# Patient Record
Sex: FEMALE | Race: WHITE | NOT HISPANIC OR LATINO
[De-identification: names, ages, dates, MRNs, and addresses within clinical notes are randomized per-mention and may not be internally consistent; named-entity substitution may affect disease eponyms.]

---
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
## 2018-01-17 ENCOUNTER — OFFICE VISIT (OUTPATIENT)
Dept: ORTHOPEDIC SURGERY | Age: 18
End: 2018-01-17

## 2018-01-17 VITALS — BODY MASS INDEX: 20.89 KG/M2 | WEIGHT: 130 LBS | HEIGHT: 66 IN

## 2018-01-17 DIAGNOSIS — M25.561 ACUTE PAIN OF RIGHT KNEE: Primary | ICD-10-CM

## 2018-01-17 PROCEDURE — 99214 OFFICE O/P EST MOD 30 MIN: CPT | Performed by: ORTHOPAEDIC SURGERY

## 2018-01-17 NOTE — PROGRESS NOTES
History of Present Illness:  Partha Sky is a 21 y.o. female recheck evaluation right knee here today to see how she is doing with her posterior cruciate ligament tear    Location right Knee   Severity much improved  Duration 4 weeks  Associated sign/symptoms pain and swelling    Medical History  Patient's medications, allergies, past medical, surgical, social and family histories were reviewed and updated as appropriate. Review of Systems  Pertinent items are noted in HPI  Review of systems reviewed from Patient History Form dated on 12/7/17 and available in the patient's chart under the Media tab. No change in the patients medical history form. Examination:  General Exam:    Vitals: Height 5' 6\" (1.676 m), weight 130 lb (59 kg). Constitutional: Patient is adequately groomed with no evidence of malnutrition  Mental Status: The patient is alert and  oriented to time, place and person. The patient's mood and affect are appropriate. Lymphatic: The lymphatic examination bilaterally reveals all areas to be without enlargement or induration. Vascular: Examination reveals no swelling or calf tenderness. Peripheral pulses are palpable and 2+. Neurological: The patient has good coordination. There is no weakness or sensory deficit. Skin:    Head/Neck: inspection reveals no rashes, ulcerations or lesions. Trunk:  inspection reveals no rashes, ulcerations or lesions. Right Lower Extremity: inspection reveals no rashes, ulcerations or lesions. Left Lower Extremity: inspection reveals no rashes, ulcerations or lesions.                                           PHYSICAL EXAM:        Knee Examination  Inspection:  No abrasions no lacerations no signs of infection or obvious deformity no obvious  swelling and joint effusion     Palpation:   Palpation reveals no  Pain medial joint line,   No lateral joint line pain,  no joint effusion    Range of Motion:  0 - 140° flexion/ extension   Hip extension to 20

## 2018-01-25 ENCOUNTER — HOSPITAL ENCOUNTER (OUTPATIENT)
Dept: PHYSICAL THERAPY | Age: 18
Discharge: HOME | End: 2018-01-31
Admitting: ORTHOPAEDIC SURGERY

## 2018-01-25 NOTE — FLOWSHEET NOTE
SL RDL 1 15    SL bridge on bosu 1 15 R         Manual Intervention      Knee mobs/PROM      Tib/Fem Mobs      Patella Mobs      Ankle mobs                  NMR re-education: 10 min      [place]an/Biofeedback 10/10      G. Med activation/sidelying      G. Max Activation/prone      Hip Ext full ROM G. Activation      Bosu Bal and Prop- G Med      Single leg stance/Balance/Prop 20 8 Black TB   Bosu Retro G. Med act      SL stand with star tap reach 1 10              Therapeutic Exercise and NMR EXR  [x] (53452) Provided verbal/tactile cueing for activities related to strengthening, flexibility, endurance, ROM for improvements in LE, proximal hip, and core control with self care, mobility, lifting, ambulation. [x] (06152) Provided verbal/tactile cueing for activities related to improving balance, coordination, kinesthetic sense, posture, motor skill, proprioception  to assist with LE, proximal hip, and core control in self care, mobility, lifting, ambulation and eccentric single leg control.      NMR and Therapeutic Activities:    [x] (54241 or 85785) Provided verbal/tactile cueing for activities related to improving balance, coordination, kinesthetic sense, posture, motor skill, proprioception and motor activation to allow for proper function of core, proximal hip and LE with self care and ADLs  [] (40360) Gait Re-education- Provided training and instruction to the patient for proper LE, core and proximal hip recruitment and positioning and eccentric body weight control with ambulation re-education including up and down stairs     Home Exercise Program:    [x] (72610) Reviewed/Progressed HEP activities related to strengthening, flexibility, endurance, ROM of core, proximal hip and LE for functional self-care, mobility, lifting and ambulation/stair navigation   [] (20045)Reviewed/Progressed HEP activities related to improving balance, coordination, kinesthetic sense, posture, motor skill, proprioception of core, proximal hip and LE for self care, mobility, lifting, and ambulation/stair navigation      Manual Treatments:  PROM / STM / Oscillations-Mobs:  G-I, II, III, IV (PA's, Inf., Post.)  [] (74463) Provided manual therapy to mobilize LE, proximal hip and/or LS spine soft tissue/joints for the purpose of modulating pain, promoting relaxation,  increasing ROM, reducing/eliminating soft tissue swelling/inflammation/restriction, improving soft tissue extensibility and allowing for proper ROM for normal function with self care, mobility, lifting and ambulation. Modalities:  Declined ice      Charges:  Timed Code Treatment Minutes: 30   Total Treatment Minutes: 50     [x] EVAL (LOW) 72763 (typically 20 minutes face-to-face)  [] EVAL (MOD) 60516 (typically 30 minutes face-to-face)  [] EVAL (HIGH) 58621 (typically 45 minutes face-to-face)  [] RE-EVAL     [x] XL(94102) x  1   [] IONTO  [x] NMR (83589) x  1   [] VASO  [] Manual (29520) x       [] Other:  [] TA x       [] Mech Traction (14155)  [] ES(attended) (69635)      [] ES (un) (37744):     GOALS:  Patient stated goal: return to hockey    Therapist goals for Patient:   Short Term Goals: To be achieved in: 2 weeks  1. Independent in HEP and progression per patient tolerance, in order to prevent re-injury. 2. Patient will have a decrease in pain to facilitate improvement in movement, function, and ADLs as indicated by Functional Deficits. Long Term Goals: To be achieved in: 4 weeks  1. Disability index score of 10% or less for the LEFS to assist with reaching prior level of function. 2. Patient will demonstrate increased AROM to WNL to allow for proper joint functioning as indicated by patients Functional Deficits. 3. Patient will demonstrate an increase in Strength to good proximal hip and knee strength and control, within 5lb HHD in LE to allow for proper functional mobility as indicated by patients Functional Deficits.    4. Patient will return to stair and squatting functional activities without increased symptoms or restriction. 5. Patient will return to playing club hockey without increased symptoms or restriction. (patient specific functional goal)    Progression Towards Functional goals:  [] Patient is progressing as expected towards functional goals listed. [] Progression is slowed due to complexities listed. [] Progression has been slowed due to co-morbidities. [x] Plan just implemented, too soon to assess goals progression  [] Other:     ASSESSMENT:  Patient tolerated all strengthening and balance well; increased fatigue and weakness noted in hamstring. Difficulty with proprioception. Educated patient to continue wearing brace when out of therapy to protect healing. Return to Play: (if applicable)   []  Stage 1: Intro to Strength   []  Stage 2: Return to Run and Strength   []  Stage 3: Return to Jump and Strength   []  Stage 4: Dynamic Strength and Agility   []  Stage 5: Sport Specific Training     []  Ready to Return to Play, Meets All Above Stages   []  Not Ready for Return to Sports   Comments:                         Treatment/Activity Tolerance:  [x] Patient tolerated treatment well [] Patient limited by fatique  [] Patient limited by pain  [] Patient limited by other medical complications  [] Other:     Prognosis: [x] Good [] Fair  [] Poor    Patient Requires Follow-up: [x] Yes  [] No      PLAN: See eval; patient to be seen 2x week for 4 weeks for strengthening, neuromuscular re-education and proprioception.    [] Continue per plan of care [] Alter current plan (see comments)  [x] Plan of care initiated [] Hold pending MD visit [] Discharge    Electronically signed by: Luis Rose [id number]

## 2018-01-25 NOTE — PLAN OF CARE
Restrictions   []Reduced participation in self care activities   []Reduced participation in home management activities   []Reduced participation in work activities   []Reduced participation in social activities. [x]Reduced participation in sport activities. Classification :    []Signs/symptoms consistent with post-surgical status including decreased ROM, strength and function.    []Signs/symptoms consistent with joint sprain/strain   []Signs/symptoms consistent with patella-femoral syndrome   []Signs/symptoms consistent with knee OA/hip OA   [x]Signs/symptoms consistent with internal derangement of knee/Hip   [x]Signs/symptoms consistent with functional hip weakness/NMR control      []Signs/symptoms consistent with tendinitis/tendinosis    []signs/symptoms consistent with pathology which may benefit from Dry needling      []other:      Prognosis/Rehab Potential:      [x]Excellent   []Good    []Fair   []Poor    Tolerance of evaluation/treatment:    []Excellent   [x]Good    []Fair   []Poor    Physical Therapy Evaluation Complexity Justification  [x] A history of present problem with:  [x] no personal factors and/or comorbidities that impact the plan of care;  []1-2 personal factors and/or comorbidities that impact the plan of care  []3 personal factors and/or comorbidities that impact the plan of care  [x] An examination of body systems using standardized tests and measures addressing any of the following: body structures and functions (impairments), activity limitations, and/or participation restrictions;:  [x] a total of 1-2 or more elements   [] a total of 3 or more elements   [] a total of 4 or more elements   [x] A clinical presentation with:  [x] stable and/or uncomplicated characteristics   [] evolving clinical presentation with changing characteristics  [] unstable and unpredictable characteristics;   [x] Clinical decision making of [x] low, [] moderate, [] high complexity using standardized patient

## 2018-01-29 ENCOUNTER — HOSPITAL ENCOUNTER (OUTPATIENT)
Dept: PHYSICAL THERAPY | Age: 18
Discharge: HOME | End: 2018-01-29
Admitting: ORTHOPAEDIC SURGERY

## 2018-01-29 NOTE — FLOWSHEET NOTE
to R   SL RDL 1 15 DL/SL Blue/ yellow KB    SL bridge on bosu 1 15 R   Glute step up       Manual Intervention      Knee mobs/PROM      Tib/Fem Mobs      Patella Mobs      Ankle mobs                  NMR re-education: 10 min      [place]ian/Biofeedback 10/10      G. Med activation/sidelying      G. Max Activation/prone      Hip Ext full ROM G. Activation      Bosu Bal and Prop- G Med      Single leg stance/Balance/Prop 20 8 Black TB ball toss    Bosu Retro G. Med act      SL stand with star tap reach 1 10              Therapeutic Exercise and NMR EXR  [x] (70515) Provided verbal/tactile cueing for activities related to strengthening, flexibility, endurance, ROM for improvements in LE, proximal hip, and core control with self care, mobility, lifting, ambulation. [x] (24941) Provided verbal/tactile cueing for activities related to improving balance, coordination, kinesthetic sense, posture, motor skill, proprioception  to assist with LE, proximal hip, and core control in self care, mobility, lifting, ambulation and eccentric single leg control.      NMR and Therapeutic Activities:    [x] (58781 or 39447) Provided verbal/tactile cueing for activities related to improving balance, coordination, kinesthetic sense, posture, motor skill, proprioception and motor activation to allow for proper function of core, proximal hip and LE with self care and ADLs  [] (35808) Gait Re-education- Provided training and instruction to the patient for proper LE, core and proximal hip recruitment and positioning and eccentric body weight control with ambulation re-education including up and down stairs     Home Exercise Program:    [x] (16489) Reviewed/Progressed HEP activities related to strengthening, flexibility, endurance, ROM of core, proximal hip and LE for functional self-care, mobility, lifting and ambulation/stair navigation   [] (48208)Reviewed/Progressed HEP activities related to improving balance, coordination, kinesthetic sense, posture, motor skill, proprioception of core, proximal hip and LE for self care, mobility, lifting, and ambulation/stair navigation      Manual Treatments:  PROM / STM / Oscillations-Mobs:  G-I, II, III, IV (PA's, Inf., Post.)  [] (44037) Provided manual therapy to mobilize LE, proximal hip and/or LS spine soft tissue/joints for the purpose of modulating pain, promoting relaxation,  increasing ROM, reducing/eliminating soft tissue swelling/inflammation/restriction, improving soft tissue extensibility and allowing for proper ROM for normal function with self care, mobility, lifting and ambulation. Modalities:  Declined ice      Charges:  Timed Code Treatment Minutes: 30   Total Treatment Minutes: 50     [x] EVAL (LOW) 86997 (typically 20 minutes face-to-face)  [] EVAL (MOD) 56633 (typically 30 minutes face-to-face)  [] EVAL (HIGH) 07143 (typically 45 minutes face-to-face)  [] RE-EVAL     [x] UD(66662) x  1   [] IONTO  [x] NMR (60004) x  1   [] VASO  [] Manual (19228) x       [] Other:  [] TA x       [] Mech Traction (27872)  [] ES(attended) (50515)      [] ES (un) (72029):     GOALS:  Patient stated goal: return to hockey    Therapist goals for Patient:   Short Term Goals: To be achieved in: 2 weeks  1. Independent in HEP and progression per patient tolerance, in order to prevent re-injury. 2. Patient will have a decrease in pain to facilitate improvement in movement, function, and ADLs as indicated by Functional Deficits. Long Term Goals: To be achieved in: 4 weeks  1. Disability index score of 10% or less for the LEFS to assist with reaching prior level of function. 2. Patient will demonstrate increased AROM to WNL to allow for proper joint functioning as indicated by patients Functional Deficits.    3. Patient will demonstrate an increase in Strength to good proximal hip and knee strength and control, within 5lb HHD in LE to allow for proper functional mobility as indicated by patients Functional

## 2018-02-01 ENCOUNTER — HOSPITAL ENCOUNTER (OUTPATIENT)
Dept: OTHER | Age: 18
Discharge: HOME | End: 2018-02-28
Attending: ORTHOPAEDIC SURGERY | Admitting: ORTHOPAEDIC SURGERY

## 2018-02-01 ENCOUNTER — HOSPITAL ENCOUNTER (OUTPATIENT)
Dept: PHYSICAL THERAPY | Age: 18
Discharge: HOME | End: 2018-02-02
Admitting: ORTHOPAEDIC SURGERY

## 2018-02-07 ENCOUNTER — OFFICE VISIT (OUTPATIENT)
Dept: ORTHOPEDIC SURGERY | Age: 18
End: 2018-02-07

## 2018-02-07 DIAGNOSIS — S83.521A TEAR OF PCL (POSTERIOR CRUCIATE LIGAMENT) OF KNEE, RIGHT, INITIAL ENCOUNTER: Primary | ICD-10-CM

## 2018-02-07 PROCEDURE — 99213 OFFICE O/P EST LOW 20 MIN: CPT | Performed by: ORTHOPAEDIC SURGERY

## 2018-02-07 NOTE — PROGRESS NOTES
History of Present Illness:  Katerina Lugo is a 21 y.o. female    Location right Knee   Severity mild  Duration 2 months post PCL injury  Associated sign/symptoms doing well in physical therapy no complaints    Medical History  Patient's medications, allergies, past medical, surgical, social and family histories were reviewed and updated as appropriate. Review of Systems  Pertinent items are noted in HPI  Review of systems reviewed from Patient History Form dated on 12/7/17 and available in the patient's chart under the Media tab. No change in the patients medical history form. Examination:  General Exam:    Vitals: There were no vitals taken for this visit. Constitutional: Patient is adequately groomed with no evidence of malnutrition  Mental Status: The patient is alert and  oriented to time, place and person. The patient's mood and affect are appropriate. Lymphatic: The lymphatic examination bilaterally reveals all areas to be without enlargement or induration. Vascular: Examination reveals no swelling or calf tenderness. Peripheral pulses are palpable and 2+. Neurological: The patient has good coordination. There is no weakness or sensory deficit. Skin:    Head/Neck: inspection reveals no rashes, ulcerations or lesions. Trunk:  inspection reveals no rashes, ulcerations or lesions. Right Lower Extremity: inspection reveals no rashes, ulcerations or lesions. Left Lower Extremity: inspection reveals no rashes, ulcerations or lesions.                                           PHYSICAL EXAM:        Knee Examination  Inspection:  No abrasions no lacerations no signs of infection or obvious deformity no obvious  swelling and joint effusion     Palpation:   Palpation reveals no  Pain medial joint line,   No lateral joint line pain,  no joint effusion    Range of Motion:  0 - 150° flexion/ extension   Hip extension to 20 hip flexion to 70+  Lumbar ROM -20 extension flexion to 6 inches from the floor      Strength: Quadriceps testing 5/5 , hamstring muscle testing 5/5, EHL against resistance is 5/5, hip flexor strength is intact 5/5, internal and external rotation of the hip against resistance is also intact 5/5    Special Tests: stable Lachman, negative anterior drawer, negative pivot shift, no posterior sag mild posterior drawer does not open to valgus or varus stress at 0 or 30° negative, Steinmann's negative, Sourav's negative, Homans negative Jovani, pedal pulses are +2/4 capillary refill is brisk sensation is intact ankle exam and hip exam are shows no pain with full range of motion provocative testing of the hip is nonpainful muscle testing around the hip is 5 over 5. Lumbar flexion to 6 inches from floor with out pain      Inspection:      Gait: antalgic     Reflex:    Lower extremity Deep tendon reflexes +2/4 and equal bilaterally for patella and Achilles  Upper extremity reflexes:  of the biceps, triceps, brachioradialis +2/4 equal bilaterally    Contralateral  Knee: Negative Lachman negative anterior drawer negative pivot shift no posterior sag no posterior drawer does not open to valgus or varus stress at 0 or 30° negative Steinmann's negative Sourav's negative Homans negative Jovani pedal pulses are +2/4 capillary refill is brisk sensation is intact ankle exam and hip exam are shows no pain with full range of motion provocative testing of the hip is nonpainful muscle testing around the hip is 5 over 5. Hip and lumbar testing does not reproduce pain evocative testing does not reproduce symptomatology. Additional Examinations:  Left Lower Extremity: Examination of the left lower extremity does not show any tenderness, deformity or injury. Range of motion is unremarkable. There is no gross instability. There are no rashes, ulcerations or lesions. Strength and tone are normal.  Thoracic Spine: Examination of the thoracic spine does not show any tenderness, deformity or injury.

## 2018-03-01 ENCOUNTER — HOSPITAL ENCOUNTER (OUTPATIENT)
Dept: OTHER | Age: 18
Discharge: HOME | End: 2018-03-31
Attending: ORTHOPAEDIC SURGERY | Admitting: ORTHOPAEDIC SURGERY

## 2018-03-28 ENCOUNTER — OFFICE VISIT (OUTPATIENT)
Dept: ORTHOPEDIC SURGERY | Age: 18
End: 2018-03-28

## 2018-03-28 VITALS — BODY MASS INDEX: 20.89 KG/M2 | HEIGHT: 66 IN | WEIGHT: 130 LBS

## 2018-03-28 DIAGNOSIS — S83.521A TEAR OF PCL (POSTERIOR CRUCIATE LIGAMENT) OF KNEE, RIGHT, INITIAL ENCOUNTER: Primary | ICD-10-CM

## 2018-03-28 PROCEDURE — 99213 OFFICE O/P EST LOW 20 MIN: CPT | Performed by: ORTHOPAEDIC SURGERY

## 2018-03-28 NOTE — PROGRESS NOTES
of the thoracic spine does not show any tenderness, deformity or injury. Range of motion is unremarkable. There is no gross instability. There are no rashes, ulcerations or lesions. Strength and tone are normal.  Lower Back: Examination of the lower back does not show any tenderness, deformity or injury. Range of motion is unremarkable. There is no gross instability. There are no rashes, ulcerations or lesions. Strength and tone are normal.    History reviewed. No pertinent surgical history. .          Assessment :  Right knee posterior cruciate ligament tear    Impression: Doing extremely well back to all regular activities play hockey without any difficulty    Office Procedures:  No orders of the defined types were placed in this encounter. Previous Treatments:  X-ray, MRI, physical therapy, brace    Possible other diagnoses:      Treatment Plan:  Follow up as needed back to all regular activities as tolerated      Salvador Last. [facility] [employer]  Board Certified  [employer] Team Physician

## 2018-10-08 ENCOUNTER — PROCEDURE VISIT (OUTPATIENT)
Dept: SPORTS MEDICINE | Age: 18
End: 2018-10-08

## 2018-10-08 DIAGNOSIS — M25.561 ACUTE PAIN OF RIGHT KNEE: Primary | ICD-10-CM

## 2018-10-08 DIAGNOSIS — S83.521A SPRAIN OF POSTERIOR CRUCIATE LIGAMENT OF RIGHT KNEE, INITIAL ENCOUNTER: ICD-10-CM

## 2018-10-08 ASSESSMENT — PAIN SCALES - GENERAL: PAINLEVEL_OUTOF10: 2

## 2018-11-02 VITALS
DIASTOLIC BLOOD PRESSURE: 60 MMHG | HEIGHT: 66 IN | BODY MASS INDEX: 20.89 KG/M2 | WEIGHT: 130 LBS | SYSTOLIC BLOOD PRESSURE: 98 MMHG | TEMPERATURE: 98.2 F

## 2018-11-06 ENCOUNTER — PROCEDURE VISIT (OUTPATIENT)
Dept: SPORTS MEDICINE | Age: 18
End: 2018-11-06

## 2018-11-06 DIAGNOSIS — S83.521D SPRAIN OF POSTERIOR CRUCIATE LIGAMENT OF RIGHT KNEE, SUBSEQUENT ENCOUNTER: Primary | ICD-10-CM

## 2018-11-06 DIAGNOSIS — S83.521A SPRAIN OF POSTERIOR CRUCIATE LIGAMENT OF RIGHT KNEE, INITIAL ENCOUNTER: Primary | ICD-10-CM

## 2018-11-06 NOTE — PROGRESS NOTES
Subjective:      Patient ID: Inocencio Martinez is a 19 y.o. female. Ruben Lane presents today to begin some light therapeutic exercises before her appointment with Dr. Dena Aguilar tomorrow. She reports little to no pain but some soreness and discomfort putting on her brace and laying in bed. Walking is fine. She is in her brace all day except for sleeping. Review of Systems    Objective:   Physical Exam    Assessment:      R PCL sprain      Plan:      SLR x4 1#2x10  Clamshells 2x30 black  Monster walks 3 laps black  Side steps 2 laps black  Step ups 30x on bosu  Lunges 2x10 Fwd & Side  SL balance 4x30\" eyes open     Iced in ATF for 20 mins    Tolerated all exercises well, did not push her into pain. Has her appointment with Dr. Dena Aguilar tomorrow. Follow DO instructions going forward.          Richie Diaz, [address] [place] [address]

## 2018-11-07 ENCOUNTER — OFFICE VISIT (OUTPATIENT)
Dept: ORTHOPEDIC SURGERY | Age: 18
End: 2018-11-07
Payer: COMMERCIAL

## 2018-11-07 ENCOUNTER — PROCEDURE VISIT (OUTPATIENT)
Dept: SPORTS MEDICINE | Age: 18
End: 2018-11-07

## 2018-11-07 VITALS — HEIGHT: 66 IN | WEIGHT: 135 LBS | BODY MASS INDEX: 21.69 KG/M2

## 2018-11-07 DIAGNOSIS — S83.521D SPRAIN OF POSTERIOR CRUCIATE LIGAMENT OF RIGHT KNEE, SUBSEQUENT ENCOUNTER: Primary | ICD-10-CM

## 2018-11-07 DIAGNOSIS — M25.561 ACUTE PAIN OF RIGHT KNEE: ICD-10-CM

## 2018-11-07 DIAGNOSIS — S83.521A TEAR OF PCL (POSTERIOR CRUCIATE LIGAMENT) OF KNEE, RIGHT, INITIAL ENCOUNTER: Primary | ICD-10-CM

## 2018-11-07 PROCEDURE — 99214 OFFICE O/P EST MOD 30 MIN: CPT | Performed by: ORTHOPAEDIC SURGERY

## 2018-11-07 ASSESSMENT — PAIN SCALES - GENERAL: PAINLEVEL_OUTOF10: 1

## 2018-11-08 ENCOUNTER — PROCEDURE VISIT (OUTPATIENT)
Dept: SPORTS MEDICINE | Age: 18
End: 2018-11-08

## 2018-11-08 DIAGNOSIS — S83.521D SPRAIN OF POSTERIOR CRUCIATE LIGAMENT OF RIGHT KNEE, SUBSEQUENT ENCOUNTER: Primary | ICD-10-CM

## 2018-11-13 ENCOUNTER — PROCEDURE VISIT (OUTPATIENT)
Dept: SPORTS MEDICINE | Age: 18
End: 2018-11-13

## 2018-11-13 DIAGNOSIS — S83.521D SPRAIN OF POSTERIOR CRUCIATE LIGAMENT OF RIGHT KNEE, SUBSEQUENT ENCOUNTER: Primary | ICD-10-CM

## 2018-11-14 ENCOUNTER — OFFICE VISIT (OUTPATIENT)
Dept: ORTHOPEDIC SURGERY | Age: 18
End: 2018-11-14
Payer: COMMERCIAL

## 2018-11-14 VITALS
DIASTOLIC BLOOD PRESSURE: 70 MMHG | WEIGHT: 135.14 LBS | BODY MASS INDEX: 21.72 KG/M2 | SYSTOLIC BLOOD PRESSURE: 112 MMHG | HEIGHT: 66 IN

## 2018-11-14 DIAGNOSIS — S83.521A TEAR OF PCL (POSTERIOR CRUCIATE LIGAMENT) OF KNEE, RIGHT, INITIAL ENCOUNTER: Primary | ICD-10-CM

## 2018-11-14 PROCEDURE — 99214 OFFICE O/P EST MOD 30 MIN: CPT | Performed by: ORTHOPAEDIC SURGERY

## 2018-11-14 NOTE — PROGRESS NOTES
Pain medial joint line,   No lateral joint line pain,  no joint effusion    Range of Motion:  0 - 150° flexion/ extension   Hip extension to 20 hip flexion to 70+  Lumbar ROM -20 extension flexion to 6 inches from the floor      Strength: Quadriceps testing 5/5 , hamstring muscle testing 5/5, EHL against resistance is 5/5, hip flexor strength is intact 5/5, internal and external rotation of the hip against resistance is also intact 5/5    Special Tests: stable Lachman, negative anterior drawer, negative pivot shift, no posterior sag very slight posterior drawer does not open to valgus or varus stress at 0 or 30° negative, Steinmann's negative, Sourav's negative, Homans negative Jovani, pedal pulses are +2/4 capillary refill is brisk sensation is intact ankle exam and hip exam are shows no pain with full range of motion provocative testing of the hip is nonpainful muscle testing around the hip is 5 over 5. Lumbar flexion to 6 inches from floor with out pain      Inspection:      Gait: antalgic     Reflex:    Lower extremity Deep tendon reflexes +2/4 and equal bilaterally for patella and Achilles  Upper extremity reflexes:  of the biceps, triceps, brachioradialis +2/4 equal bilaterally    Contralateral  Knee: Negative Lachman negative anterior drawer negative pivot shift no posterior sag no posterior drawer does not open to valgus or varus stress at 0 or 30° negative Steinmann's negative Sourav's negative Homans negative Jovani pedal pulses are +2/4 capillary refill is brisk sensation is intact ankle exam and hip exam are shows no pain with full range of motion provocative testing of the hip is nonpainful muscle testing around the hip is 5 over 5. Hip and lumbar testing does not reproduce pain evocative testing does not reproduce symptomatology. Additional Examinations:  Left Lower Extremity: Examination of the left lower extremity does not show any tenderness, deformity or injury.   Range of motion is Certified  [facility] Team Physician        Disclaimer: \"This note was dictated with voice recognition software. Though review and correction are routine, we apologize for any errors. \"

## 2018-11-15 ENCOUNTER — PROCEDURE VISIT (OUTPATIENT)
Dept: SPORTS MEDICINE | Age: 18
End: 2018-11-15

## 2018-11-15 DIAGNOSIS — S83.521D SPRAIN OF POSTERIOR CRUCIATE LIGAMENT OF RIGHT KNEE, SUBSEQUENT ENCOUNTER: Primary | ICD-10-CM

## 2018-11-16 ENCOUNTER — PROCEDURE VISIT (OUTPATIENT)
Dept: SPORTS MEDICINE | Age: 18
End: 2018-11-16

## 2018-11-16 DIAGNOSIS — S83.521D SPRAIN OF POSTERIOR CRUCIATE LIGAMENT OF RIGHT KNEE, SUBSEQUENT ENCOUNTER: Primary | ICD-10-CM

## 2018-11-27 ENCOUNTER — PROCEDURE VISIT (OUTPATIENT)
Dept: SPORTS MEDICINE | Age: 18
End: 2018-11-27

## 2018-11-27 DIAGNOSIS — S83.521D SPRAIN OF POSTERIOR CRUCIATE LIGAMENT OF RIGHT KNEE, SUBSEQUENT ENCOUNTER: Primary | ICD-10-CM

## 2018-11-28 VITALS
RESPIRATION RATE: 16 BRPM | HEART RATE: 61 BPM | OXYGEN SATURATION: 98 % | TEMPERATURE: 98 F | DIASTOLIC BLOOD PRESSURE: 70 MMHG | SYSTOLIC BLOOD PRESSURE: 110 MMHG

## 2018-11-28 VITALS
HEART RATE: 88 BPM | TEMPERATURE: 100 F | DIASTOLIC BLOOD PRESSURE: 70 MMHG | OXYGEN SATURATION: 99 % | RESPIRATION RATE: 16 BRPM | SYSTOLIC BLOOD PRESSURE: 110 MMHG

## 2018-11-28 VITALS
OXYGEN SATURATION: 99 % | RESPIRATION RATE: 16 BRPM | HEART RATE: 54 BPM | TEMPERATURE: 98.4 F | DIASTOLIC BLOOD PRESSURE: 70 MMHG | SYSTOLIC BLOOD PRESSURE: 102 MMHG

## 2018-12-03 ENCOUNTER — PROCEDURE VISIT (OUTPATIENT)
Dept: SPORTS MEDICINE | Age: 18
End: 2018-12-03

## 2018-12-03 DIAGNOSIS — M70.22 OLECRANON BURSITIS OF LEFT ELBOW: ICD-10-CM

## 2018-12-03 DIAGNOSIS — S83.521D SPRAIN OF POSTERIOR CRUCIATE LIGAMENT OF RIGHT KNEE, SUBSEQUENT ENCOUNTER: Primary | ICD-10-CM

## 2018-12-05 ENCOUNTER — PROCEDURE VISIT (OUTPATIENT)
Dept: SPORTS MEDICINE | Age: 18
End: 2018-12-05

## 2018-12-05 DIAGNOSIS — S83.521D SPRAIN OF POSTERIOR CRUCIATE LIGAMENT OF RIGHT KNEE, SUBSEQUENT ENCOUNTER: Primary | ICD-10-CM

## 2018-12-05 DIAGNOSIS — M70.22 OLECRANON BURSITIS OF LEFT ELBOW: ICD-10-CM

## 2018-12-12 ENCOUNTER — OFFICE VISIT (OUTPATIENT)
Dept: ORTHOPEDIC SURGERY | Age: 18
End: 2018-12-12
Payer: COMMERCIAL

## 2018-12-12 VITALS — BODY MASS INDEX: 22.49 KG/M2 | WEIGHT: 135 LBS | HEIGHT: 65 IN

## 2018-12-12 DIAGNOSIS — S83.521A TEAR OF PCL (POSTERIOR CRUCIATE LIGAMENT) OF KNEE, RIGHT, INITIAL ENCOUNTER: Primary | ICD-10-CM

## 2018-12-12 PROCEDURE — 99214 OFFICE O/P EST MOD 30 MIN: CPT | Performed by: ORTHOPAEDIC SURGERY

## 2018-12-12 NOTE — PROGRESS NOTES
12/12/18  History of Present Illness:  Tra Rucker is a 19 y.o. female    Chief complaint today in the office:  Right knee is doing much better no significant pain she has a PCL injury that's doing very well    Location right Knee   Severity moderate  Duration several weeks now  Associated sign/symptoms no symptomatology at this time back playing with no pain    Medical History  Patient's medications, allergies, past medical, surgical, social and family histories were reviewed and updated as appropriate. Review of Systems  No new rashes  No numbness  No tingling  No fever  No depression  No new pain pattern  Pertinent items are noted in HPI  Review of systems reviewed from Patient History Form dated on 12/12/18 and available in the patient's chart under the Media tab. No change in the patients medical history form. Examination:  General Exam:    Vitals: Height 5' 5\" (1.651 m), weight 135 lb (61.2 kg). Constitutional: Patient is adequately groomed with no evidence of malnutrition  Mental Status: The patient is alert and  oriented to time, place and person. The patient's mood and affect are appropriate. Lymphatic: The lymphatic examination bilaterally reveals all areas to be without enlargement or induration. Vascular: Examination reveals no swelling or calf tenderness. Peripheral pulses are palpable and 2+. Neurological: The patient has good coordination. There is no weakness or sensory deficit. Skin:    Head/Neck: inspection reveals no rashes, ulcerations or lesions. Trunk:  inspection reveals no rashes, ulcerations or lesions. Right Lower Extremity: inspection reveals no rashes, ulcerations or lesions. Left Lower Extremity: inspection reveals no rashes, ulcerations or lesions.                                           PHYSICAL EXAM:        Knee Examination  Inspection:  No abrasions no lacerations no signs of infection or obvious deformity no obvious  swelling and joint effusion

## 2018-12-15 ENCOUNTER — NURSE ONLY (OUTPATIENT)
Dept: INTERNAL MEDICINE | Age: 18
End: 2018-12-15

## 2018-12-15 DIAGNOSIS — Z23 NEED FOR PROPHYLACTIC VACCINATION AND INOCULATION AGAINST INFLUENZA: Primary | ICD-10-CM

## 2018-12-15 PROCEDURE — 90471 IMMUNIZATION ADMIN: CPT

## 2018-12-15 PROCEDURE — 90686 IIV4 VACC NO PRSV 0.5 ML IM: CPT

## 2019-01-10 ENCOUNTER — WALK IN (OUTPATIENT)
Dept: URGENT CARE | Age: 19
End: 2019-01-10

## 2019-01-10 VITALS
OXYGEN SATURATION: 100 % | RESPIRATION RATE: 16 BRPM | DIASTOLIC BLOOD PRESSURE: 70 MMHG | HEART RATE: 73 BPM | SYSTOLIC BLOOD PRESSURE: 108 MMHG | TEMPERATURE: 97.9 F

## 2019-01-10 DIAGNOSIS — K13.79 MOUTH SORES: Primary | ICD-10-CM

## 2019-01-10 PROCEDURE — 99214 OFFICE O/P EST MOD 30 MIN: CPT | Performed by: FAMILY MEDICINE

## 2019-01-10 RX ORDER — NORETHINDRONE ACETATE AND ETHINYL ESTRADIOL 1MG-20(21)
1 KIT ORAL
COMMUNITY
Start: 2018-07-26

## 2019-01-10 RX ORDER — VALACYCLOVIR HYDROCHLORIDE 1 G/1
2000 TABLET, FILM COATED ORAL 2 TIMES DAILY
Qty: 4 TABLET | Refills: 1 | Status: SHIPPED | OUTPATIENT
Start: 2019-01-10 | End: 2019-01-11

## 2019-01-28 ENCOUNTER — PROCEDURE VISIT (OUTPATIENT)
Dept: SPORTS MEDICINE | Age: 19
End: 2019-01-28

## 2019-01-28 ENCOUNTER — OFFICE VISIT (OUTPATIENT)
Dept: PRIMARY CARE CLINIC | Age: 19
End: 2019-01-28
Payer: COMMERCIAL

## 2019-01-28 VITALS
SYSTOLIC BLOOD PRESSURE: 108 MMHG | HEART RATE: 67 BPM | DIASTOLIC BLOOD PRESSURE: 69 MMHG | BODY MASS INDEX: 22.27 KG/M2 | RESPIRATION RATE: 18 BRPM | WEIGHT: 133.8 LBS

## 2019-01-28 DIAGNOSIS — S06.0X0A CONCUSSION WITHOUT LOSS OF CONSCIOUSNESS, INITIAL ENCOUNTER: Primary | ICD-10-CM

## 2019-01-28 DIAGNOSIS — S06.0X0A CEREBRAL CONCUSSION, WITHOUT LOSS OF CONSCIOUSNESS, INITIAL ENCOUNTER: Primary | ICD-10-CM

## 2019-01-28 PROCEDURE — 99202 OFFICE O/P NEW SF 15 MIN: CPT | Performed by: INTERNAL MEDICINE

## 2019-01-28 RX ORDER — METHYLPHENIDATE HYDROCHLORIDE 27 MG/1
1 TABLET, EXTENDED RELEASE ORAL DAILY
Refills: 0 | COMMUNITY
Start: 2019-01-24

## 2019-01-28 RX ORDER — NORETHINDRONE ACETATE AND ETHINYL ESTRADIOL 1; .02 MG/1; MG/1
1 TABLET ORAL DAILY
COMMUNITY

## 2019-01-28 ASSESSMENT — PAIN SCALES - GENERAL: PAINLEVEL_OUTOF10: 2

## 2019-01-29 ENCOUNTER — PROCEDURE VISIT (OUTPATIENT)
Dept: SPORTS MEDICINE | Age: 19
End: 2019-01-29

## 2019-01-29 DIAGNOSIS — S06.0X0D CEREBRAL CONCUSSION, WITHOUT LOSS OF CONSCIOUSNESS, SUBSEQUENT ENCOUNTER: Primary | ICD-10-CM

## 2019-01-31 ENCOUNTER — OFFICE VISIT (OUTPATIENT)
Dept: PRIMARY CARE CLINIC | Age: 19
End: 2019-01-31
Payer: COMMERCIAL

## 2019-01-31 ENCOUNTER — PROCEDURE VISIT (OUTPATIENT)
Dept: SPORTS MEDICINE | Age: 19
End: 2019-01-31

## 2019-01-31 VITALS
HEART RATE: 80 BPM | BODY MASS INDEX: 22.07 KG/M2 | RESPIRATION RATE: 18 BRPM | DIASTOLIC BLOOD PRESSURE: 77 MMHG | WEIGHT: 132.6 LBS | SYSTOLIC BLOOD PRESSURE: 127 MMHG

## 2019-01-31 DIAGNOSIS — S06.0X0D CONCUSSION WITHOUT LOSS OF CONSCIOUSNESS, SUBSEQUENT ENCOUNTER: Primary | ICD-10-CM

## 2019-01-31 DIAGNOSIS — S06.0X0D CEREBRAL CONCUSSION, WITHOUT LOSS OF CONSCIOUSNESS, SUBSEQUENT ENCOUNTER: Primary | ICD-10-CM

## 2019-01-31 PROCEDURE — 99213 OFFICE O/P EST LOW 20 MIN: CPT | Performed by: INTERNAL MEDICINE

## 2019-02-01 ENCOUNTER — PROCEDURE VISIT (OUTPATIENT)
Dept: SPORTS MEDICINE | Age: 19
End: 2019-02-01

## 2019-02-01 DIAGNOSIS — S06.0X0D CEREBRAL CONCUSSION, WITHOUT LOSS OF CONSCIOUSNESS, SUBSEQUENT ENCOUNTER: Primary | ICD-10-CM

## 2019-02-04 ENCOUNTER — OFFICE VISIT (OUTPATIENT)
Dept: PRIMARY CARE CLINIC | Age: 19
End: 2019-02-04
Payer: COMMERCIAL

## 2019-02-04 VITALS
WEIGHT: 131 LBS | HEART RATE: 104 BPM | DIASTOLIC BLOOD PRESSURE: 79 MMHG | BODY MASS INDEX: 21.8 KG/M2 | RESPIRATION RATE: 18 BRPM | SYSTOLIC BLOOD PRESSURE: 124 MMHG

## 2019-02-04 DIAGNOSIS — S06.0X0D CONCUSSION WITHOUT LOSS OF CONSCIOUSNESS, SUBSEQUENT ENCOUNTER: Primary | ICD-10-CM

## 2019-02-04 PROCEDURE — 99213 OFFICE O/P EST LOW 20 MIN: CPT | Performed by: INTERNAL MEDICINE

## 2019-02-21 ENCOUNTER — OFFICE VISIT (OUTPATIENT)
Dept: ORTHOPEDIC SURGERY | Age: 19
End: 2019-02-21
Payer: COMMERCIAL

## 2019-02-21 VITALS
HEIGHT: 66 IN | BODY MASS INDEX: 20.89 KG/M2 | WEIGHT: 130 LBS | SYSTOLIC BLOOD PRESSURE: 109 MMHG | DIASTOLIC BLOOD PRESSURE: 67 MMHG

## 2019-02-21 DIAGNOSIS — S69.82XA TFCC (TRIANGULAR FIBROCARTILAGE COMPLEX) INJURY, LEFT, INITIAL ENCOUNTER: ICD-10-CM

## 2019-02-21 DIAGNOSIS — M25.532 ACUTE PAIN OF LEFT WRIST: Primary | ICD-10-CM

## 2019-02-21 PROCEDURE — 99203 OFFICE O/P NEW LOW 30 MIN: CPT | Performed by: ORTHOPAEDIC SURGERY

## 2019-02-21 PROCEDURE — 20605 DRAIN/INJ JOINT/BURSA W/O US: CPT | Performed by: ORTHOPAEDIC SURGERY

## 2019-02-21 RX ORDER — LANOLIN ALCOHOL/MO/W.PET/CERES
10 CREAM (GRAM) TOPICAL DAILY
COMMUNITY

## 2019-02-21 RX ORDER — IBUPROFEN 200 MG
200 TABLET ORAL EVERY 6 HOURS PRN
COMMUNITY

## 2019-03-04 NOTE — PROGRESS NOTES
Results for the following test have been finalized and entered into the patients chart.
Extremity: Examination of the left lower extremity does not show any tenderness, deformity or injury. Range of motion is unremarkable. There is no gross instability. There are no rashes, ulcerations or lesions. Strength and tone are normal.  Right Upper Extremity:  Examination of the right upper extremity does not show any tenderness, deformity or injury. Range of motion is unremarkable. There is no gross instability. There are no rashes, ulcerations or lesions. Strength and tone are normal.  Left Upper Extremity: Examination of the left upper extremity does not show any tenderness, deformity or injury. Range of motion is unremarkable. There is no gross instability. There are no rashes, ulcerations or lesions. Strength and tone are normal.  Thoracic Spine: Examination of the thoracic spine does not show any tenderness, deformity or injury. Range of motion is unremarkable. There is no gross instability. There are no rashes, ulcerations or lesions. Strength and tone are normal.  Lower Back: Examination of the lower back does not show any tenderness, deformity or injury. Range of motion is unremarkable. There is no gross instability. There are no rashes, ulcerations or lesions. Strength and tone are normal.    History reviewed. No pertinent surgical history. .        Assessment :  Reinjury to a posterior cruciate ligament partial tear    Impression: Same    Office Procedures:  No orders of the defined types were placed in this encounter. Previous Treatments:  X-ray, MRI, physical therapy, brace    Possible other diagnoses:      Treatment Plan:  I would like her to continue to wear the brace I don't want her doing any contact until we obtain another MRI we'll do the MRI and see her back in compared to the old one to decide if she is going to be able to continue to play this year      Augusta Ospina D.O.   [address] [facility] Trained  Board Certified  [address] [place] [address]

## 2019-03-14 ENCOUNTER — PROCEDURE VISIT (OUTPATIENT)
Dept: SPORTS MEDICINE | Age: 19
End: 2019-03-14

## 2019-03-14 DIAGNOSIS — S60.221A CONTUSION OF MULTIPLE SITES OF HAND AND WRIST, RIGHT, INITIAL ENCOUNTER: Primary | ICD-10-CM

## 2019-03-14 DIAGNOSIS — S60.211A CONTUSION OF MULTIPLE SITES OF HAND AND WRIST, RIGHT, INITIAL ENCOUNTER: Primary | ICD-10-CM

## 2019-03-14 ASSESSMENT — PAIN SCALES - GENERAL: PAINLEVEL_OUTOF10: 5

## 2019-04-04 ENCOUNTER — OFFICE VISIT (OUTPATIENT)
Dept: ORTHOPEDIC SURGERY | Age: 19
End: 2019-04-04
Payer: COMMERCIAL

## 2019-04-04 DIAGNOSIS — S69.82XA TFCC (TRIANGULAR FIBROCARTILAGE COMPLEX) INJURY, LEFT, INITIAL ENCOUNTER: Primary | ICD-10-CM

## 2019-04-04 DIAGNOSIS — S63.501A SPRAIN OF RIGHT WRIST, INITIAL ENCOUNTER: ICD-10-CM

## 2019-04-04 DIAGNOSIS — M25.531 RIGHT WRIST PAIN: ICD-10-CM

## 2019-04-04 PROCEDURE — 99213 OFFICE O/P EST LOW 20 MIN: CPT | Performed by: ORTHOPAEDIC SURGERY

## 2019-04-04 NOTE — PROGRESS NOTES
Assessment: 78-year-old female presenting with history of bilateral wrist pain, left-sided ulnar pain suspicious for TFCC pathology  Right wrist pain, possible scapholunate or wrist sprain    Treatment Plan: With regards to her left wrist, the patient has been responding well now after injection and activity modification and was able to play in competition without any new symptoms. I did discuss my differential diagnosis including TFCC pathology and if she does continue to have symptoms as she returns to her activities that I would recommend likely obtaining an MRI to further evaluate    Similarly for her right wrist she has had improvement in her symptoms overall with use of intermittent bracing and limitations of some of her activity. She specifically has pain near scapholunate interval with the suspicion for possible injury or wrist sprain. Again I discussed obtaining an MRI specifically if symptoms have not improved over the next 3-4 weeks so that we can further evaluate the wrist.  Otherwise she may continue with taping and bracing and should limit her overall activities with wrist extension and resistance at this point  I will see her back in 1 month for repeat evaluation of both wrists    No follow-ups on file. History of Present Illness  Stephen Hernandez is a 19 y.o. 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 She was traveling to [facility] for hockey competition and was having left ulnar-sided wrist pain suspicious for possible TFCC pathology  We did perform a corticosteroid injection and advised rest and limitations of activity prior to her leaving.   Currently her left wrist has been feeling well, just this morning she noticed a small amount of clicking with certain and motions while working out but she denies any new pain, no symptoms while playing hockey or stickhandling. She complains today primarily of some resolving but present right wrist pain  While playing hockey in [facility] she hyperflexed her wrist and ran into an opponent. She continued playing during that time period and was taping her wrist followed by bracing after her competition. The pain has overall improved but with certain motions and gripping she does notice some discomfort. She describes this mainly near the dorsal aspect of her wrist.  No prior history of pain in her wrist  She denies any mechanical symptoms here and has not had any stiffness specifically      Review of Systems  Pertinent items are noted in HPI  Review of systems reviewed from Patient History Form dated on 2/21/19 and available in the patient's chart under the Media tab. Vital Signs  There were no vitals filed for this visit. Physical Exam  Constitutional:  Patient is well-nourished and demonstrates normal hygiene. Mental Status:  Patient is alert and oriented to person, place and time. Skin:  Intact, no rashes or lesions. Left Wrist/left upper extremity Examination:     Inspection:  There is no obvious swelling or deformity of the left wrist, no skin changes   symmetrical appearance of distal radial ulnar joint compared with contralateral wrist        Palpation: No ulnar foveal tenderness or dorsal TFCC/lunotriquetral joint  Globally nontender throughout the remainder of the wrist and hand  Specifically nontender hook of hamate and hypothenar eminence     Range of Motion:  symmetrical range of motion with 75° of wrist flexion and extension  70° of pronation supination compared with contralateral wrist  No laxity or increased instability of distal radial ulnar joint compared with contralateral wrist in all planes     Strength:   Active 5 out of 5 finger flexion extension abduction  No thenar atrophy or intrinsic wasting  5 out of 5 EPL FPL and thumb abduction  No pain with resisted  strength     Special Tests:  Gross sensation intact in median ulnar and radial nerve without deficit  Negative Tinel's overlying Guyon's canal  There is no pain elicited with ulnar deviation of the wrist with axial loading and pronation and supination  With rotation of wrist the patient does have some painless clicking difficult to detect exact location, symmetrical to contralateral wrist  No gross instability sign no midcarpal instability  Symmetrical stability of distal radial ulnar joint compared to contralateral wrist with no pain    Right wrist/right upper extremity  No swelling or deformity  There is what appears to be a slight bony prominence at the base of the index metacarpal possibly representing carpal boss  Nontender to palpation in this area  There is some specific scapholunate interval mild tenderness  Negative Elizabeth scaphoid shift test with no mechanical symptoms or pain  Near symmetrical range of motion with some pain with terminal extension of wrist motion  No increased pain with radial ulnar deviation  No pain with pronation or supination  Globally nontender throughout remainder of wrist exam with palpation      Capillary refill brisk all fingers, symmetric  Gross sensation intact to light touch median/ulnar/radial nerves  Sensation intact to radial/ulnar aspect of fingertip        Radiology:    X-rays obtained and reviewed in office:  Views 3  Location right wrist  Impression : No evidence of acute fracture or dislocation  No evidence of intercarpal or radiocarpal abnormality.  Specifically no obvious scapholunate widening and no DISI deformity    Additional Diagnostic Test Findings:    Office Procedures:  Orders Placed This Encounter   Procedures    XR WRIST RIGHT (MIN 3 VIEWS)           Remedios Richardson MD  Orthopaedic Surgeon, Hand & Upper Extremity   [facility]    Contact Information:  [place]: [phone]  Clinical )    This dictation was performed with a verbal recognition program (DRAGON) and it was checked for errors. It is possible that there are still dictated errors within this office note. If so, please bring any errors to my attention for an addendum. All efforts were made to ensure that this office note is accurate.

## 2019-05-02 ENCOUNTER — OFFICE VISIT (OUTPATIENT)
Dept: ORTHOPEDIC SURGERY | Age: 19
End: 2019-05-02
Payer: COMMERCIAL

## 2019-05-02 DIAGNOSIS — S63.501A SPRAIN OF RIGHT WRIST, INITIAL ENCOUNTER: Primary | ICD-10-CM

## 2019-05-02 DIAGNOSIS — S69.82XA TFCC (TRIANGULAR FIBROCARTILAGE COMPLEX) INJURY, LEFT, INITIAL ENCOUNTER: ICD-10-CM

## 2019-05-02 PROCEDURE — 99212 OFFICE O/P EST SF 10 MIN: CPT | Performed by: ORTHOPAEDIC SURGERY

## 2019-05-02 NOTE — PROGRESS NOTES
bowling recently and had no pain in her right wrist. No new swelling, she has some occasional clicking with rotation of both of her wrists but this is not associated with pain. No other complaints today    Review of Systems  Pertinent items are noted in HPI  Review of systems reviewed from Patient History Form dated on 2/21/19 and available in the patient's chart under the Media tab. Vital Signs  There were no vitals filed for this visit. Physical Exam  Constitutional:  Patient is well-nourished and demonstrates normal hygiene. Mental Status:  Patient is alert and oriented to person, place and time. Skin:  Intact, no rashes or lesions.      Left Wrist/left upper extremity Examination:     Inspection:  There is no obvious swelling or deformity of the left wrist, no skin changes   symmetrical appearance of distal radial ulnar joint compared with contralateral wrist        Palpation: No ulnar foveal tenderness or dorsal TFCC/lunotriquetral joint  Globally nontender throughout the remainder of the wrist and hand  Specifically nontender hook of hamate and hypothenar eminence     Range of Motion:  symmetrical range of motion with 75° of wrist flexion and extension  70° of pronation supination compared with contralateral wrist  No laxity or increased instability of distal radial ulnar joint compared with contralateral wrist in all planes     Strength:  Active 5 out of 5 finger flexion extension abduction  No thenar atrophy or intrinsic wasting  5 out of 5 EPL FPL and thumb abduction  No pain with resisted  strength     Special Tests:  Gross sensation intact in median ulnar and radial nerve without deficit  Negative Tinel's overlying Guyon's canal  There is no pain elicited with ulnar deviation of the wrist with axial loading and pronation and supination  With rotation of wrist the patient does have some painless clicking difficult to detect exact location, symmetrical to contralateral wrist; negative Elizabeth scaphoid shift  No gross instability sign no midcarpal instability  Symmetrical stability of distal radial ulnar joint compared to contralateral wrist with no pain     Right wrist/right upper extremity  No swelling or deformity  There is what appears to be a slight bony prominence at the base of the index metacarpal possibly representing carpal boss  Nontender to palpation in this area  There is minimal tenderness near scapholunate ligament  Negative Elizabeth scaphoid shift test with no mechanical symptoms or pain   symmetrical range of motion with some pain with terminal extension of wrist motion  No increased pain with radial ulnar deviation  No pain with pronation or supination  Globally nontender throughout remainder of wrist exam with palpation        Capillary refill brisk all fingers, symmetric  Gross sensation intact to light touch median/ulnar/radial nerves  Sensation intact to radial/ulnar aspect of fingertip        Radiology:    X-rays obtained and reviewed in office:  No new images obtained today     Additional Diagnostic Test Findings:    Office Procedures:  No orders of the defined types were placed in this encounter. Aarti Ortez MD  Orthopaedic Surgeon, [address]    Contact Information:  [place] Vo: [phone]  Clinical )    This dictation was performed with a verbal recognition program [facility]) and it was checked for errors. It is possible that there are still dictated errors within this office note. If so, please bring any errors to my attention for an addendum. All efforts were made to ensure that this office note is accurate.

## 2019-10-07 ENCOUNTER — OFFICE VISIT (OUTPATIENT)
Dept: PRIMARY CARE CLINIC | Age: 19
End: 2019-10-07
Payer: COMMERCIAL

## 2019-10-07 VITALS
BODY MASS INDEX: 22.18 KG/M2 | DIASTOLIC BLOOD PRESSURE: 58 MMHG | HEIGHT: 66 IN | SYSTOLIC BLOOD PRESSURE: 100 MMHG | WEIGHT: 138 LBS

## 2019-10-07 DIAGNOSIS — G44.209 ACUTE NON INTRACTABLE TENSION-TYPE HEADACHE: Primary | ICD-10-CM

## 2019-10-07 DIAGNOSIS — Z23 NEED FOR VACCINATION: ICD-10-CM

## 2019-10-07 PROCEDURE — 99213 OFFICE O/P EST LOW 20 MIN: CPT | Performed by: NURSE PRACTITIONER

## 2019-10-07 PROCEDURE — 90686 IIV4 VACC NO PRSV 0.5 ML IM: CPT | Performed by: NURSE PRACTITIONER

## 2019-10-07 PROCEDURE — 90471 IMMUNIZATION ADMIN: CPT | Performed by: NURSE PRACTITIONER

## 2019-10-07 ASSESSMENT — PATIENT HEALTH QUESTIONNAIRE - PHQ9
1. LITTLE INTEREST OR PLEASURE IN DOING THINGS: 0
DEPRESSION UNABLE TO ASSESS: FUNCTIONAL CAPACITY MOTIVATION LIMITS ACCURACY
SUM OF ALL RESPONSES TO PHQ9 QUESTIONS 1 & 2: 0
2. FEELING DOWN, DEPRESSED OR HOPELESS: 0
SUM OF ALL RESPONSES TO PHQ QUESTIONS 1-9: 0
SUM OF ALL RESPONSES TO PHQ QUESTIONS 1-9: 0

## 2019-11-25 ENCOUNTER — PROCEDURE VISIT (OUTPATIENT)
Dept: SPORTS MEDICINE | Age: 19
End: 2019-11-25

## 2019-11-25 DIAGNOSIS — S83.412A SPRAIN OF MEDIAL COLLATERAL LIGAMENT OF LEFT KNEE, INITIAL ENCOUNTER: Primary | ICD-10-CM

## 2019-11-25 ASSESSMENT — PAIN SCALES - GENERAL: PAINLEVEL_OUTOF10: 8

## 2019-11-26 ENCOUNTER — TELEPHONE (OUTPATIENT)
Dept: ORTHOPEDIC SURGERY | Age: 19
End: 2019-11-26

## 2019-11-26 ENCOUNTER — OFFICE VISIT (OUTPATIENT)
Dept: ORTHOPEDIC SURGERY | Age: 19
End: 2019-11-26
Payer: COMMERCIAL

## 2019-11-26 VITALS — WEIGHT: 135 LBS | BODY MASS INDEX: 21.69 KG/M2 | HEIGHT: 66 IN

## 2019-11-26 DIAGNOSIS — S83.412A SPRAIN OF MEDIAL COLLATERAL LIGAMENT OF LEFT KNEE, INITIAL ENCOUNTER: ICD-10-CM

## 2019-11-26 DIAGNOSIS — M25.562 LEFT KNEE PAIN, UNSPECIFIED CHRONICITY: Primary | ICD-10-CM

## 2019-11-26 PROCEDURE — L1833 KO ADJ JNT POS R SUP PRE OTS: HCPCS | Performed by: ORTHOPAEDIC SURGERY

## 2019-11-26 PROCEDURE — 99214 OFFICE O/P EST MOD 30 MIN: CPT | Performed by: ORTHOPAEDIC SURGERY

## 2019-12-10 ENCOUNTER — OFFICE VISIT (OUTPATIENT)
Dept: ORTHOPEDIC SURGERY | Age: 19
End: 2019-12-10
Payer: COMMERCIAL

## 2019-12-10 VITALS — WEIGHT: 134.92 LBS | BODY MASS INDEX: 21.68 KG/M2 | HEIGHT: 66 IN | RESPIRATION RATE: 15 BRPM

## 2019-12-10 DIAGNOSIS — S83.412A SPRAIN OF MEDIAL COLLATERAL LIGAMENT OF LEFT KNEE, INITIAL ENCOUNTER: Primary | ICD-10-CM

## 2019-12-10 PROCEDURE — 99213 OFFICE O/P EST LOW 20 MIN: CPT | Performed by: ORTHOPAEDIC SURGERY

## 2019-12-12 ENCOUNTER — PROCEDURE VISIT (OUTPATIENT)
Dept: SPORTS MEDICINE | Age: 19
End: 2019-12-12

## 2019-12-12 DIAGNOSIS — S83.412D SPRAIN OF MEDIAL COLLATERAL LIGAMENT OF LEFT KNEE, SUBSEQUENT ENCOUNTER: Primary | ICD-10-CM

## 2019-12-16 DIAGNOSIS — S83.412A SPRAIN OF MEDIAL COLLATERAL LIGAMENT OF LEFT KNEE, INITIAL ENCOUNTER: Primary | ICD-10-CM

## 2019-12-19 ENCOUNTER — TELEPHONE (OUTPATIENT)
Dept: ORTHOPEDIC SURGERY | Age: 19
End: 2019-12-19

## 2020-01-14 ENCOUNTER — OFFICE VISIT (OUTPATIENT)
Dept: ORTHOPEDIC SURGERY | Age: 20
End: 2020-01-14
Payer: COMMERCIAL

## 2020-01-14 VITALS — HEIGHT: 66 IN | WEIGHT: 134.92 LBS | BODY MASS INDEX: 21.68 KG/M2 | RESPIRATION RATE: 15 BRPM

## 2020-01-14 PROCEDURE — 99213 OFFICE O/P EST LOW 20 MIN: CPT | Performed by: ORTHOPAEDIC SURGERY

## 2020-01-14 NOTE — PROGRESS NOTES
normal.      Assessment : Left knee MCL sprain    Impression:  Encounter Diagnosis   Name Primary?  Sprain of medial collateral ligament of left knee, initial encounter Yes       Office Procedures:  No orders of the defined types were placed in this encounter. Treatment Plan: Doing well at this time. She may start returning back to some hockey activities initially with just going on the ice and skating without contact. I do want her to wear her brace as she is returning back to hockey.   I will see her back for any further problems

## 2020-01-20 ENCOUNTER — PROCEDURE VISIT (OUTPATIENT)
Dept: SPORTS MEDICINE | Age: 20
End: 2020-01-20

## 2020-01-20 NOTE — PROGRESS NOTES
Subjective:      Patient ID: Giselle Scott is a 22 y.o. female. Kane Aguila comes in today for therapeutic exercises for her left Knee MCL sprain. She reports she skated last week and her knee felt very weak and unstable. However over the weekend she felt some more stability but is not ready to skate in a contact drill. Review of Systems    Objective:   Physical Exam    Assessment:      Left MCL Sprain      Plan: Today Namita completed the following therapeutic exercises:    Bike 5'  Line jumps Fwd Side Single and double legs  Wall jumps 30x  Skater jumps  Triple threat  Lunges Fwd/Side  SAQ/LAQ 2#  SLR x4 2#  Ice. Continue to be aggressive with the physical therapy. Can skate non-contact in practice. Follow up on Thursday.              Mars Charles, [address] [facility]

## 2020-01-23 ENCOUNTER — PROCEDURE VISIT (OUTPATIENT)
Dept: SPORTS MEDICINE | Age: 20
End: 2020-01-23

## 2020-01-27 ENCOUNTER — PROCEDURE VISIT (OUTPATIENT)
Dept: SPORTS MEDICINE | Age: 20
End: 2020-01-27

## 2020-01-27 NOTE — PROGRESS NOTES
Subjective:      Patient ID: Aashish Ortiz is a 22 y.o. female. Ayana Helms comes in today in follow up for her L knee sprain. She reports that she did her first non contact practice Thursday. She notes that she was not able to do much, mostly because they did a lot of contact drills, but also because she couldn't physically do the last few non contact drills. Namita reports being sore (3.5/10) today but overall is feeling better. She feels like she is making a lot of progress with her strength. She has been doing her exercises on her own on days she has not come in to the [address] [place] [address]. Namita plans to do another non contact practice tomorrow. HPI    Review of Systems    Objective:   Physical Exam    Assessment:      L knee MCL sprain      Plan: Today Namita did:     Stationary Bike Warm Up 5 mins  Triple Threat  Clamshells 3x10  3-way lunge on Airex 3x10  3-pt touches on airex w/ band 2x6 each  SLB on airex w/ ball toss 2x15  Heel taps 4\" 3x10 each  BOSU Squats 3x10    Namita was able to complete all exercises without pain. Follow up in [address] [place] [address] daily or as frequently as her schedule will allow. Will discuss the possibility of full contact practice later this week. Her goal is still to return to games on the weekend of 2/7. I feel we are still on track for this to be a possibility.           Cornel Fernandez, [address] [place] [address]

## 2020-01-29 ENCOUNTER — PROCEDURE VISIT (OUTPATIENT)
Dept: SPORTS MEDICINE | Age: 20
End: 2020-01-29

## 2020-01-31 ENCOUNTER — PROCEDURE VISIT (OUTPATIENT)
Dept: SPORTS MEDICINE | Age: 20
End: 2020-01-31

## 2020-01-31 NOTE — PROGRESS NOTES
Subjective:      Patient ID: Loyd Haney is a 22 y.o. female. Georg Eisenmenger comes in today for therapeutic exercises for her L MCL sprain. She reports that practice has been going okay. She could feel her knee start to cave in towards the end of the first drill at Thursday's practice and believes it is because she got fatigued. Otherwise, she was able to participate in all the drills with no breaks for the remainder of the noncontact portion of practice. There was one moment where she stopped and shot the puck and she had some pain because her left leg was her trail leg. HPI    Review of Systems    Objective:   Physical Exam    Assessment:      L knee MCL Sprain      Plan: Today Namita did:  Bike 5 mins  3-way lunges on BOSU  Heel taps 6\" 3x12  Knee ER w/ band 3x10 each  Hip ABD w/ SB [id number] hold each  Standing knee drive with black TB 3x8 each  6\" step-up with med ball perts [id number]    Namita tolerated all exercises well. Needs to work on muscular endurance to prevent knee valgus from occurring when she fatigues. She is considering participating in the contact portion of practice on Tuesday but is unsure about participation in next weekend's game. She mentioned feeling slightly rushed back by the  but is not sure if she is going to be ready for games. Will come in next week to continue her exercises.          Karel Cardoso

## 2020-02-03 ENCOUNTER — PROCEDURE VISIT (OUTPATIENT)
Dept: SPORTS MEDICINE | Age: 20
End: 2020-02-03

## 2020-02-03 NOTE — PROGRESS NOTES
Subjective:      Patient ID: Loyd Haney is a 22 y.o. female. Georg Eisenmenger comes in today in follow up for her L MCL sprain. She has not been on the ice since we last saw her on Friday. She would like to start progressing to full contact this week. However, she feels that she is being pressured by her coaches to return to aggressively. HPI      Review of Systems      Objective:   Physical Exam      Assessment:      L knee MCL Sprain      Plan: Today Namita did:  Warm Up on Stationary Bike 5 mins  3-way lunges on BOSU 3x10  Heel taps 6\" 3x12  Knee ER w/ band 3x10 each  Hip ABD w/ SB [id number] hold each  Standing knee drive with black TB 3x8 each  Monster Walks/Side Steps  6\" step-up with med ball perts [id number]    Namita tolerated all exercises well. We will continue to work on muscular endurance. Georg Eisenmenger notes that there is not a lot of contact on Tuesday practices. We discussed limiting contact to 20-30 minutes tomorrow, regardless of how her knee feels. On Wednesday we will discuss Thursday's practice plan as well as games for the weekend. We discussed that at best she would be playing limited minutes this weekend, and she agreed. Follow up Wednesday to continue her exercises.          Bethel Camacho, [employer]

## 2020-02-05 ENCOUNTER — PROCEDURE VISIT (OUTPATIENT)
Dept: SPORTS MEDICINE | Age: 20
End: 2020-02-05

## 2020-02-05 NOTE — PROGRESS NOTES
Subjective:      Patient ID: Silvano King is a 22 y.o. female. Mitchell Verde comes in today for therapeutic exercises for her L knee MCL sprain. She reports she participated in some live contact yesterday at practice and she felt good. She was able to skate turns and quick stops without having pain or feeling her knee caving in. She wanted to do more but practice ended. Her knee did not fatigue this time but notes that she does feel out of shape anyway. HPI    Review of Systems    Objective:   Physical Exam    Assessment:      L knee MCL sprain      Plan: Today Namita did:  Warm Up on Stationary Bike 5 mins  3-way lunges on BOSU 3x10  Fwd/bwd and s/s SL jumps  Heel taps w/ airex 4\" 3x12  Knee ER w/ band 3x10 each  Standing knee drive with black TB 3x8 each  Agility ladder  6\" step-up with med ball perts [id number]    Namita tolerated all exercises well today. She reports that the plan is still to see how tomorrow feels and play limited minutes this weekend. However, she admits that since practice went so well on Tuesday she wants to play this weekend with no restrictions but acknowledged she knew that wasn't a good idea. Will check in after practice tomorrow and decide on specific limitations for this weekend.         Jay Crawford

## 2020-02-06 ENCOUNTER — PROCEDURE VISIT (OUTPATIENT)
Dept: SPORTS MEDICINE | Age: 20
End: 2020-02-06

## 2020-02-10 ENCOUNTER — PROCEDURE VISIT (OUTPATIENT)
Dept: SPORTS MEDICINE | Age: 20
End: 2020-02-10

## 2020-02-10 NOTE — PROGRESS NOTES
Subjective:      Patient ID: Laura Mccollum is a 22 y.o. female. Hernandez Bolivar comes in today in follow up for her knee pain. She played games on Friday and Saturday. She reports having pain Friday, but she played 8 shifts. Saturday she felt much better and played 7 longer shifts. She reports feeling good today. Knee Pain          Review of Systems    Objective:   Physical Exam    Assessment:      MCL Sprain, resolving      Plan: Today Namita did the following exercises:    Warm Up on Stationary Bike 5 mins  3-way lunges on BOSU 3x10  Fwd/bwd and s/s SL jumps  Heel taps w/ airex 4\" 3x12  Knee ER w/ band 3x10 each  Standing knee drive with black TB 3x8 each  BOSU Squats 3x10  Side Steps  6\" step-up with med ball perts [id number]    Namita tolerated all exercises well. Cleared for full practice tomorrow. Continue to follow up in [facility] for therapeutic exercises.     Domingo Paniagua, [facility]

## 2020-02-13 ENCOUNTER — PROCEDURE VISIT (OUTPATIENT)
Dept: SPORTS MEDICINE | Age: 20
End: 2020-02-13

## 2020-02-13 NOTE — PROGRESS NOTES
Subjective:      Patient ID: Sushant Carl is a 22 y.o. female. Sundar Vinson comes in today in follow up for her knee pain. She reports that practices this week have gone well, but practice this morning was very intense, and she has some muscle fatigue/soreness. She has games tomorrow and Saturday. Knee Pain          Review of Systems    Objective:   Physical Exam    Assessment:      MCL Sprain, resolving      Plan: Today Namita did the following exercises:    Warm Up on Stationary Bike 5 mins  3-way lunges on BOSU 3x10  Fwd/bwd and s/s SL jumps  Heel taps w/ airex 4\" 3x12  Knee ER w/ band 3x10 each  Standing knee drive with black TB 3x8 each  BOSU Squats 3x10  Side Steps  Monster Walks  6\" step-up with med ball perts [id number]    Namita tolerated all exercises well. We discussed increasing her playing time this weekend. She will limit herself to 3 shifts per period, as tolerated. Continue to follow up in [address] [place] [address] for therapeutic exercises.     Vanessa Nichole, [address] [place] [address]

## 2020-02-26 ENCOUNTER — PROCEDURE VISIT (OUTPATIENT)
Dept: SPORTS MEDICINE | Age: 20
End: 2020-02-26

## 2020-03-05 ENCOUNTER — TELEPHONE (OUTPATIENT)
Dept: ORTHOPEDIC SURGERY | Age: 20
End: 2020-03-05

## 2020-03-05 ENCOUNTER — OFFICE VISIT (OUTPATIENT)
Dept: ORTHOPEDIC SURGERY | Age: 20
End: 2020-03-05
Payer: COMMERCIAL

## 2020-03-05 VITALS — HEIGHT: 66 IN | BODY MASS INDEX: 21.68 KG/M2 | WEIGHT: 134.92 LBS

## 2020-03-05 PROCEDURE — 99213 OFFICE O/P EST LOW 20 MIN: CPT | Performed by: ORTHOPAEDIC SURGERY

## 2020-03-05 NOTE — PROGRESS NOTES
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 She does continue to have symptoms mainly ulnar-sided that are slightly different from some of her symptoms in the past.  There is a small see of bone adjacent to the triquetrum on exam which may represent intrinsic ligament injury on the ulnar side of the wrist.  I recommend at this point obtaining an MRI of her left wrist for further evaluation. I discussed modifications including wrist taping particularly during her activities with hockey and if she is unable to perform she may consider backing off of this as well. I also discussed using a wrist brace for day-to-day activities if this is painful but avoiding any elective lifting and gripping otherwise. She may continue with ibuprofen as needed for now and we will plan to see her back after MRI is completed    No follow-ups on file. History of Present Illness  Ray Kruger is a 22 y.o. female here today for a follow-up for her left wrist pain  I had last seen the patient in May 2019 for ulnar-sided wrist pain. We did suspect possible TFCC pathology at that time she had been treated with conservative management for sprain including bracing injection and activity modification  Patient improved significantly until approximately 2 months ago. She states that she has had some aggravation of the pain mostly ulnar-sided again over the last 2 months. Over the weekend it was more severe and has subsided since that time.   She has been using ibuprofen and also taping her wrist during hockey as she is a [employer]  Her season is currently winding down  She denies any specific injury that caused the symptoms and mainly only has a symptoms when playing hockey. Review of Systems  Pertinent items are noted in HPI  Review of systems reviewed from Patient History Form dated on 11/26/19 and available in the patient's chart under the Media tab. Vital Signs  There were no vitals filed for this visit. Physical Exam  Constitutional:  Patient is well-nourished and demonstrates normal hygiene. Mental Status:  Patient is alert and oriented to person, place and time. Skin:  Intact, no rashes or lesions.      Left Wrist/left upper extremity Examination:     Inspection:  There is no obvious swelling or deformity of the left wrist, no skin changes   symmetrical appearance of distal radial ulnar joint compared with contralateral wrist        Palpation: No ulnar foveal tenderness, mild tenderness dorsally near the lunotriquetral interval  Globally nontender throughout the remainder of the wrist and hand  Specifically nontender hook of hamate and hypothenar eminence     Range of Motion:  symmetrical range of motion with 75° of wrist flexion and extension  70° of pronation supination compared with contralateral wrist  No laxity or increased instability of distal radial ulnar joint compared with contralateral wrist in all planes     Strength:  Active 5 out of 5 finger flexion extension abduction  No thenar atrophy or intrinsic wasting  5 out of 5 EPL FPL and thumb abduction  No pain with resisted  strength     Special Tests:  Gross sensation intact in median ulnar and radial nerve without deficit  Negative Tinel's overlying Guyon's canal  There is minimal pain elicited with ulnar deviation of the wrist with axial loading and pronation and supination  With rotation of wrist the patient does have some painless clicking difficult to detect exact location, symmetrical to contralateral wrist; negative Elizabeth scaphoid shift unchanged from previous examinations  No gross instability sign no midcarpal instability  Symmetrical stability of distal radial ulnar joint compared to contralateral wrist with no pain     Capillary refill brisk all fingers, symmetric  Gross sensation intact to light touch median/ulnar/radial nerves  Sensation intact to radial/ulnar aspect of fingertip        Radiology:    X-rays obtained and reviewed in office:  Views 3  Location left wrist  Impression approximately 1 to 2 mm ulnar positive variance. No obvious distal radial ulnar joint subluxation. There is a small see of bone adjacent to the triquetrum which may represent acute or subacute injury. No additional intercarpal radiocarpal alignment changes    Additional Diagnostic Test Findings:    Office Procedures:  No orders of the defined types were placed in this encounter. Tonya Perez MD  Orthopaedic Surgeon, Hand & Upper Extremity   [facility]    Contact Information:  Durgabahman Alfredo: [phone] Clinical )    This dictation was performed with a verbal recognition program [facility] mgMEDIA [facility]) and it was checked for errors. It is possible that there are still dictated errors within this office note. If so, please bring any errors to my attention for an addendum. All efforts were made to ensure that this office note is accurate.

## 2020-03-05 NOTE — TELEPHONE ENCOUNTER
Called and left message for patient that MRI was approved and she can call [facility] to schedule. Told her to call office and schedule follow up after MRI.   MRI LEFT WRIST approved Auth# [id number]

## 2020-03-11 ENCOUNTER — PROCEDURE VISIT (OUTPATIENT)
Dept: SPORTS MEDICINE | Age: 20
End: 2020-03-11

## 2020-03-11 NOTE — PROGRESS NOTES
Subjective:      Patient ID: Sailaja Callaway is a 22 y.o. female. Laura Villatoro comes in today for therapeutic exercises for her L knee MCL sprain. She reports she skated yesterday and it felt okay, but she feels it is not completely stable. She is very apprehensive to return to activity. 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 She will be home for at least a month. HPI    Review of Systems    Objective:   Physical Exam    Assessment:      L knee MCL sprain      Plan: Today Namita did:  Warm Up on Stationary Bike 5 mins  3-way lunges on BOSU 3x10  Fwd/bwd and diagonal jumps (2 feet)  Heel taps w/ airex 4\" x20  Standing knee drive with black TB 3x8 each  6\" step-up with med ball perts [id number]  Kettle Bell Squats 2x12    Namita tolerated all exercises well today. I have given her a progressive rehab plan for while she is home. I would like her to check in with me at least once a week via phone or email to discuss her progress and make adjustments as needed. She can reach out more frequently if needed.         Juan Yu

## 2020-08-12 NOTE — PROGRESS NOTES
Subjective:      Patient ID: Cristal Leos is a 22 y.o. female. Corrina Talavera comes in today for therapeutic exercises for her L knee. She reports that she was able to skate at practice and did not feel like her knee was caving in. She was also able to skate significantly faster than when she skated last week. Reports being sore (2/10) today but overall is feeling better. Is planning to participate in a non contact practice this afternoon depending on how sore she is feeling. HPI    Review of Systems    Objective:   Physical Exam    Assessment:      L knee MCL sprain      Plan: Today Namita did:   Bike 5 mins  3-way lunge on Airex   3-pt touches on airex w/ band 2x6 each  SLB on airex w/ ball toss  Heel taps 4\" 3x10 each  Glute bridge 2x15 w/ 5 sec hold at top  BOSU Squats 3x10    Was unable to do single leg glute bridge because she reported some pain. Continue to progress single leg strength activities. Will come in next week for further rehab.  Is not going to play this weekend - aiming to return to games on the weekend of 2/7          FRANCISCO Lucas Patient/Caregiver provided printed discharge information.